# Patient Record
(demographics unavailable — no encounter records)

---

## 2017-02-23 DIAGNOSIS — F33.0 MAJOR DEPRESSIVE DISORDER, RECURRENT EPISODE, MILD (H): ICD-10-CM

## 2017-02-23 NOTE — TELEPHONE ENCOUNTER
citalopram (CELEXA) 10 MG tablet     Last Written Prescription Date: 8/26/16  Last Fill Quantity: 90, # refills: 1  Last Office Visit with G primary care provider:  12/21/16        Last PHQ-9 score on record=   PHQ-9 SCORE 8/26/2016   Total Score 3

## 2017-02-27 RX ORDER — CITALOPRAM HYDROBROMIDE 10 MG/1
10 TABLET ORAL DAILY
Qty: 90 TABLET | Refills: 3 | Status: SHIPPED | OUTPATIENT
Start: 2017-02-27